# Patient Record
Sex: FEMALE | Race: WHITE | Employment: STUDENT | URBAN - METROPOLITAN AREA
[De-identification: names, ages, dates, MRNs, and addresses within clinical notes are randomized per-mention and may not be internally consistent; named-entity substitution may affect disease eponyms.]

---

## 2017-04-06 ENCOUNTER — OFFICE VISIT (OUTPATIENT)
Dept: URGENT CARE | Facility: RETAIL CLINIC | Age: 27
End: 2017-04-06
Payer: COMMERCIAL

## 2017-04-06 VITALS
OXYGEN SATURATION: 97 % | DIASTOLIC BLOOD PRESSURE: 63 MMHG | SYSTOLIC BLOOD PRESSURE: 105 MMHG | TEMPERATURE: 98.1 F | HEART RATE: 69 BPM

## 2017-04-06 DIAGNOSIS — H66.002 ACUTE SUPPURATIVE OTITIS MEDIA OF LEFT EAR WITHOUT SPONTANEOUS RUPTURE OF TYMPANIC MEMBRANE, RECURRENCE NOT SPECIFIED: Primary | ICD-10-CM

## 2017-04-06 PROCEDURE — 99203 OFFICE O/P NEW LOW 30 MIN: CPT | Performed by: PHYSICIAN ASSISTANT

## 2017-04-06 RX ORDER — AMOXICILLIN 875 MG
875 TABLET ORAL 2 TIMES DAILY
Qty: 20 TABLET | Refills: 0 | Status: SHIPPED | OUTPATIENT
Start: 2017-04-06 | End: 2017-04-16

## 2017-04-06 NOTE — PROGRESS NOTES
Chief Complaint   Patient presents with     Nasal Congestion     has had a cold 2-3 weeks     Otalgia     Left; 2 days     SUBJECTIVE:  Amy QUINONES is a 26 year old female who presents for evaluation of left ear pain for 2 days and cold symptoms with nasal congestin for 2-3 weeks.  Severity: moderate   Timing: gradual onset  Additional symptoms include sneezing, post nasal drip, cough from tickle in throat and nasal congestion. No facial pain or pressure.  Treatment measures tried include: OTC meds.  History of recurrent otitis: yes- several AOMs with PE tubes, left TM surgery to patch perforation  Predisposing factors include: None.    Past Medical History:   Diagnosis Date     Depressive disorder, not elsewhere classified      Obesity, unspecified      Current Outpatient Prescriptions   Medication Sig Dispense Refill     amoxicillin (AMOXIL) 875 MG tablet Take 1 tablet (875 mg) by mouth 2 times daily for 10 days 20 tablet 0     NO ACTIVE MEDICATIONS Reported on 4/6/2017       IBUPROFEN Reported on 4/6/2017       Social History   Substance Use Topics     Smoking status: Never Smoker     Smokeless tobacco: Never Used     Alcohol use Yes      Comment: Social     Allergies   Allergen Reactions     No Known Drug Allergies      ROS:   Review of systems negative except as stated above.    OBJECTIVE:  /63 (BP Location: Left arm)  Pulse 69  Temp 98.1  F (36.7  C) (Oral)  SpO2 97%   GENERAL: awake alert and in no acute distress  EARS:   Right TM in neutral position without effusion or erythema. Normal landmarks are not easily visible due to scarring. Auditory canal without drainage, edema, erythema.  Left TM is bulging with significant scar tissue. Purulent effusion and erythema. Normal landmarks are not visible. Auditory canal without drainage, edema, erythema.  ENT: EOMI,  PERRL, conjunctiva clear. Nares bilaterally without edematous turbinates or discharge. Posterior pharynx is not erythematous.  NECK:  supple, non-tender to palpation, no adenopathy noted.   RESP: lungs clear to auscultation - no rales, rhonchi or wheezes  CV: regular rates and rhythm, normal S1 S2, no murmur noted    ASSESSMENT:    ICD-10-CM    1. Acute suppurative otitis media of left ear without spontaneous rupture of tympanic membrane, recurrence not specified H66.002 amoxicillin (AMOXIL) 875 MG tablet     PLAN:   Patient Instructions   Amoxicillin twice daily for 10 days.  Warm washcloth on ear may help with pain.  Flonase 2 sprays in each nostril daily until symptoms resolve, then continue 1 spray in each nostril for at least 5 more days.  Take Tylenol or an NSAID such as ibuprofen or naproxen as needed for pain.  Sudafed behind the pharmacist counter for 3-5 days helps relieve congestion  Take an antihistamine such as Claritin (loratadine), Zyrtec (cetirizine) or Allegra (fexofenadine) daily for allergy symptoms.  -take for 2 weeks then reevaluate. May take as long as needed.  Mucinex (guiafenesin) thins mucus and may help it to loosen more quickly  Contact primary care clinic if you do not improve or worsen.    Follow up with primary care provider with any problems, questions or concerns or if symptoms worsen or fail to improve. Patient agreed to plan and verbalized understanding.    Shazia Lake PA-C  [facility]

## 2017-04-06 NOTE — MR AVS SNAPSHOT
"              After Visit Summary   4/6/2017    Amy QUINONES    MRN: [id number]           Patient Information     Date Of Birth          1990        Visit Information        Provider Department      4/6/2017 12:30 PM Umu Lake PA-C [facility]        Today's Diagnoses     Acute suppurative otitis media of left ear without spontaneous rupture of tympanic membrane, recurrence not specified    -  1      Care Instructions    Amoxicillin twice daily for 10 days.  Warm washcloth on ear may help with pain.  Flonase 2 sprays in each nostril daily until symptoms resolve, then continue 1 spray in each nostril for at least 5 more days.  Take Tylenol or an NSAID such as ibuprofen or naproxen as needed for pain.  Sudafed behind the pharmacist counter for 3-5 days helps relieve congestion  Take an antihistamine such as Claritin (loratadine), Zyrtec (cetirizine) or Allegra (fexofenadine) daily for allergy symptoms.  -take for 2 weeks then reevaluate. May take as long as needed.  Mucinex (guiafenesin) thins mucus and may help it to loosen more quickly  Contact primary care clinic if you do not improve or worsen.        Follow-ups after your visit        Who to contact     You can reach your care team any time of the day by calling [phone].  Notification of test results:  If you have an abnormal lab result, we will notify you by phone as soon as possible.         Additional Information About Your Visit        MyChart Information     [employer] lets you send messages to your doctor, view your test results, renew your prescriptions, schedule appointments and more. To sign up, go to [email or web address] . Click on \"Log in\" on the left side of the screen, which will take you to the Welcome page. Then click on \"Sign up Now\" on the right side of the page.     You will be asked to enter the access code listed below, as well as some personal information. Please follow the directions to create your " username and password.     Your access code is: [email or web address]  Expires: 2017  1:18 PM     Your access code will  in 90 days. If you need help or a new code, please call your [facility] or [phone].        Care EveryWhere ID     This is your Care EveryWhere ID. This could be used by other organizations to access your [facility] medical records  [id number]        Your Vitals Were     Pulse Temperature Pulse Oximetry             69 98.1  F (36.7  C) (Oral) 97%          Blood Pressure from Last 3 Encounters:   17 105/63   12 116/75   11 108/82    Weight from Last 3 Encounters:   11 254 lb (115.2 kg)   11 253 lb 6.4 oz (114.9 kg)   11 255 lb (115.7 kg)              Today, you had the following     No orders found for display       Primary Care Provider Office Phone # Fax #    Cha Pelayo PA-C [phone]       [address] [address]        Thank you!     Thank you for choosing [facility]  for your care. Our goal is always to provide you with excellent care. Hearing back from our patients is one way we can continue to improve our services. Please take a few minutes to complete the written survey that you may receive in the mail after your visit with us. Thank you!             Your Updated Medication List - Protect others around you: Learn how to safely use, store and throw away your medicines at www.disposemymeds.org.          This list is accurate as of: 17  1:18 PM.  Always use your most recent med list.                   Brand Name Dispense Instructions for use    IBUPROFEN      Reported on 2017       NO ACTIVE MEDICATIONS      Reported on 2017

## 2017-04-06 NOTE — PATIENT INSTRUCTIONS
Amoxicillin twice daily for 10 days.  Warm washcloth on ear may help with pain.  Flonase 2 sprays in each nostril daily until symptoms resolve, then continue 1 spray in each nostril for at least 5 more days.  Take Tylenol or an NSAID such as ibuprofen or naproxen as needed for pain.  Sudafed behind the pharmacist counter for 3-5 days helps relieve congestion  Take an antihistamine such as Claritin (loratadine), Zyrtec (cetirizine) or Allegra (fexofenadine) daily for allergy symptoms.  -take for 2 weeks then reevaluate. May take as long as needed.  Mucinex (guiafenesin) thins mucus and may help it to loosen more quickly  Contact primary care clinic if you do not improve or worsen.

## 2017-04-06 NOTE — NURSING NOTE
"Chief Complaint   Patient presents with     Nasal Congestion     has had a cold 2-3 weeks     Otalgia     Left; 2 days       Initial /63 (BP Location: Left arm)  Pulse 69  Temp 98.1  F (36.7  C) (Oral)  SpO2 97% Estimated body mass index is 39.78 kg/(m^2) as calculated from the following:    Height as of 7/6/11: 5' 7\" (1.702 m).    Weight as of 7/6/11: 254 lb (115.2 kg).  Medication Reconciliation: complete  "

## 2017-05-26 ENCOUNTER — HEALTH MAINTENANCE LETTER (OUTPATIENT)
Age: 27
End: 2017-05-26